# Patient Record
Sex: FEMALE | Race: WHITE
[De-identification: names, ages, dates, MRNs, and addresses within clinical notes are randomized per-mention and may not be internally consistent; named-entity substitution may affect disease eponyms.]

---

## 2017-11-29 NOTE — RAD
RIGHT HIP TWO VIEWS:

 

History: Hip pain. No history of trauma given. 

 

FINDINGS: 

There are mild arthritic changes of the hip joint. There are no signs of fracture or dislocation. 

 

IMPRESSION: 

Mild arthritic changes of the hip without significant joint space narrowing and minimal spur formatio
n.

 

POS: [facility]

## 2018-08-04 ENCOUNTER — HOSPITAL ENCOUNTER (EMERGENCY)
Dept: HOSPITAL 9 - MADERS | Age: 68
Discharge: HOME | End: 2018-08-04
Payer: MEDICARE

## 2018-08-04 DIAGNOSIS — Z79.899: ICD-10-CM

## 2018-08-04 DIAGNOSIS — J06.9: Primary | ICD-10-CM

## 2018-08-04 DIAGNOSIS — F41.9: ICD-10-CM

## 2018-08-04 DIAGNOSIS — F17.210: ICD-10-CM

## 2018-08-04 DIAGNOSIS — E03.9: ICD-10-CM

## 2018-08-04 PROCEDURE — 96372 THER/PROPH/DIAG INJ SC/IM: CPT

## 2019-04-14 ENCOUNTER — HOSPITAL ENCOUNTER (EMERGENCY)
Dept: HOSPITAL 9 - MADERS | Age: 69
Discharge: HOME | End: 2019-04-14
Payer: MEDICARE

## 2019-04-14 DIAGNOSIS — E03.9: ICD-10-CM

## 2019-04-14 DIAGNOSIS — Z79.899: ICD-10-CM

## 2019-04-14 DIAGNOSIS — I10: ICD-10-CM

## 2019-04-14 DIAGNOSIS — Z71.6: ICD-10-CM

## 2019-04-14 DIAGNOSIS — F41.9: ICD-10-CM

## 2019-04-14 DIAGNOSIS — F17.210: ICD-10-CM

## 2019-04-14 DIAGNOSIS — J06.9: Primary | ICD-10-CM

## 2019-04-14 PROCEDURE — 99406 BEHAV CHNG SMOKING 3-10 MIN: CPT

## 2023-01-04 ENCOUNTER — HOSPITAL ENCOUNTER (OUTPATIENT)
Dept: HOSPITAL 92 - RAD-FRANK | Age: 73
Discharge: HOME | End: 2023-01-04
Attending: NURSE PRACTITIONER
Payer: MEDICARE

## 2023-01-04 DIAGNOSIS — M51.36: ICD-10-CM

## 2023-01-04 DIAGNOSIS — M47.816: ICD-10-CM

## 2023-01-04 DIAGNOSIS — M48.061: Primary | ICD-10-CM

## 2023-01-04 PROCEDURE — 72100 X-RAY EXAM L-S SPINE 2/3 VWS: CPT

## 2023-03-27 ENCOUNTER — HOSPITAL ENCOUNTER (OUTPATIENT)
Dept: HOSPITAL 92 - RAD-FRANK | Age: 73
Discharge: HOME | End: 2023-03-27
Attending: NURSE PRACTITIONER
Payer: MEDICARE

## 2023-03-27 DIAGNOSIS — F17.200: Primary | ICD-10-CM

## 2023-03-27 DIAGNOSIS — R91.1: ICD-10-CM

## 2023-03-27 PROCEDURE — 71046 X-RAY EXAM CHEST 2 VIEWS: CPT
